# Patient Record
Sex: MALE | Race: WHITE | NOT HISPANIC OR LATINO | URBAN - METROPOLITAN AREA
[De-identification: names, ages, dates, MRNs, and addresses within clinical notes are randomized per-mention and may not be internally consistent; named-entity substitution may affect disease eponyms.]

---

## 2019-06-13 ENCOUNTER — EMERGENCY (EMERGENCY)
Facility: HOSPITAL | Age: 79
LOS: 0 days | Discharge: HOME | End: 2019-06-13
Attending: EMERGENCY MEDICINE | Admitting: EMERGENCY MEDICINE
Payer: MEDICAID

## 2019-06-13 VITALS
RESPIRATION RATE: 16 BRPM | OXYGEN SATURATION: 100 % | TEMPERATURE: 98 F | DIASTOLIC BLOOD PRESSURE: 70 MMHG | HEART RATE: 70 BPM | SYSTOLIC BLOOD PRESSURE: 158 MMHG

## 2019-06-13 VITALS — WEIGHT: 141.1 LBS | HEIGHT: 64.57 IN

## 2019-06-13 DIAGNOSIS — R42 DIZZINESS AND GIDDINESS: ICD-10-CM

## 2019-06-13 LAB
ALBUMIN SERPL ELPH-MCNC: 3.6 G/DL — SIGNIFICANT CHANGE UP (ref 3.3–5)
ALP SERPL-CCNC: 54 U/L — SIGNIFICANT CHANGE UP (ref 40–120)
ALT FLD-CCNC: 41 U/L — SIGNIFICANT CHANGE UP (ref 12–78)
ANION GAP SERPL CALC-SCNC: 8 MMOL/L — SIGNIFICANT CHANGE UP (ref 5–17)
APTT BLD: 24 SEC — LOW (ref 27.5–36.3)
AST SERPL-CCNC: 35 U/L — SIGNIFICANT CHANGE UP (ref 15–37)
BILIRUB SERPL-MCNC: 0.5 MG/DL — SIGNIFICANT CHANGE UP (ref 0.2–1.2)
BUN SERPL-MCNC: 21 MG/DL — SIGNIFICANT CHANGE UP (ref 7–23)
CALCIUM SERPL-MCNC: 9.5 MG/DL — SIGNIFICANT CHANGE UP (ref 8.5–10.1)
CHLORIDE SERPL-SCNC: 108 MMOL/L — SIGNIFICANT CHANGE UP (ref 96–108)
CO2 SERPL-SCNC: 24 MMOL/L — SIGNIFICANT CHANGE UP (ref 22–31)
CREAT SERPL-MCNC: 1.28 MG/DL — SIGNIFICANT CHANGE UP (ref 0.5–1.3)
GLUCOSE SERPL-MCNC: 172 MG/DL — HIGH (ref 70–99)
HCT VFR BLD CALC: 37.6 % — LOW (ref 39–50)
HGB BLD-MCNC: 12.8 G/DL — LOW (ref 13–17)
INR BLD: 0.95 RATIO — SIGNIFICANT CHANGE UP (ref 0.88–1.16)
MAGNESIUM SERPL-MCNC: 1.9 MG/DL — SIGNIFICANT CHANGE UP (ref 1.6–2.6)
MCHC RBC-ENTMCNC: 32.3 PG — SIGNIFICANT CHANGE UP (ref 27–34)
MCHC RBC-ENTMCNC: 34 GM/DL — SIGNIFICANT CHANGE UP (ref 32–36)
MCV RBC AUTO: 94.9 FL — SIGNIFICANT CHANGE UP (ref 80–100)
PLATELET # BLD AUTO: 191 K/UL — SIGNIFICANT CHANGE UP (ref 150–400)
POTASSIUM SERPL-MCNC: 3.9 MMOL/L — SIGNIFICANT CHANGE UP (ref 3.5–5.3)
POTASSIUM SERPL-SCNC: 3.9 MMOL/L — SIGNIFICANT CHANGE UP (ref 3.5–5.3)
PROT SERPL-MCNC: 7.2 GM/DL — SIGNIFICANT CHANGE UP (ref 6–8.3)
PROTHROM AB SERPL-ACNC: 10.5 SEC — SIGNIFICANT CHANGE UP (ref 10–12.9)
RBC # BLD: 3.96 M/UL — LOW (ref 4.2–5.8)
RBC # FLD: 12.8 % — SIGNIFICANT CHANGE UP (ref 10.3–14.5)
SODIUM SERPL-SCNC: 140 MMOL/L — SIGNIFICANT CHANGE UP (ref 135–145)
TROPONIN I SERPL-MCNC: <0.015 NG/ML — SIGNIFICANT CHANGE UP (ref 0.01–0.04)
WBC # BLD: 11.06 K/UL — HIGH (ref 3.8–10.5)
WBC # FLD AUTO: 11.06 K/UL — HIGH (ref 3.8–10.5)

## 2019-06-13 PROCEDURE — 99284 EMERGENCY DEPT VISIT MOD MDM: CPT

## 2019-06-13 PROCEDURE — 71045 X-RAY EXAM CHEST 1 VIEW: CPT | Mod: 26

## 2019-06-13 PROCEDURE — 93010 ELECTROCARDIOGRAM REPORT: CPT

## 2019-06-13 PROCEDURE — 70450 CT HEAD/BRAIN W/O DYE: CPT | Mod: 26

## 2019-06-13 RX ORDER — MECLIZINE HCL 12.5 MG
1 TABLET ORAL
Qty: 20 | Refills: 0
Start: 2019-06-13

## 2019-06-13 NOTE — ED STATDOCS - PROGRESS NOTE DETAILS
Ama AS for Dr. Noble: 79 y/o male presents to the ED c/o dizziness since this morning. Pt states this morning when he bent down to tie his shoes he started to feel dizzy. Dizziness as in the room was spinning. Pt also states that after the dizziness he had 3 episodes of vomiting. Also states that he has some mild chest discomfort. Pt took his BP, which was 180/65. Denies SOB. Will send pt to main ED for further evaluation.

## 2019-06-13 NOTE — ED PROVIDER NOTE - OBJECTIVE STATEMENT
79 y/o male with no pertinent PMHx presents to the ED c/o dizziness since this morning. Pt states this morning he woke up fine and ate breakfast. States he was going to go outside when he bent over to put his socks on and felt dizzy. Dizziness described as the room was spinning. States he laid down because he also felt generalized weakness. After two hours he still felt dizzy and began vomiting. States symptoms improved when laying down. States he thinks his symptoms might be related to what he ate. Had " a lot" of watermelon yesterday and cottage cheese this morning. In ED, pt states he feels better. Denies chest pain.

## 2019-06-13 NOTE — ED ADULT NURSE NOTE - NS ED NURSE RECORD ANOTHER HT AND WT
6/2/2017 12:18 PM 
 
Mr. Sal Travis [address] [address] Dear  Mikey Jose, 
 
 
I am a Nurse Navigator working with your primary care provider (PCP), Dr. Tisha Jackman. Part of my job is to follow up with patients who have been in the hospital to see how they are feeling, answer any questions they may have about their visit, and also make sure they have a follow-up appointment to see their primary care doctor. I have been unable to reach you by telephone and wanted to make sure that you scheduled a follow-up appointment to come in and talk to Dr. Tisha Jackman about your recent visit to the hospital.   
 
Please call our office at [phone] to schedule your appointment as soon as possible. If you have any questions or concerns, please call [phone]. Thank you for allowing us to participate in your care! Sincerely, Dulce NEWELL, RN   Nurse Navigator [address] Office   [phone] Fax   [phone] Yes

## 2019-06-13 NOTE — ED ADULT NURSE NOTE - OBJECTIVE STATEMENT
Pt alert and oriented x3. pt presents with dizziness before his morning walk this morning. Pt states "I got up for my walk and felt the room spinning" Pt denies chest pain SOB, palpitations. States dizziness is "a little better" at this time.

## 2019-06-13 NOTE — ED PROVIDER NOTE - EYES, MLM
Clear bilaterally, pupils equal, round and reactive to light. +Few beats of horizontal nystagmus right gaze

## 2019-06-13 NOTE — ED PROVIDER NOTE - PROGRESS NOTE DETAILS
Remains asymptomatic.  Will d/c with vertigo instructions -- Rx Meclizine PRN.  Add'l hx -- patient visiting from [place] with recent plane flight.  Some discomfort right ear during flight - ?related.

## 2021-01-05 NOTE — ED PROVIDER NOTE - RESPIRATORY, MLM
Breath sounds clear and equal bilaterally. [FreeTextEntry1] : Pleasant 36 year old male with no past medical history here for follow up.\par \par \par 1. 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 seronegative RA\par \par -Negative MEENA, RF, CCP. Normal ESR and CRP in 9/2020 \par -X-ray of right foot, bilateral knees,  bilateral ankles negative in 2019\par -X-ray of bilateral knees and elbows negative. Bilateral hands show diffuse swelling of right third PIP in 8/2020 \par -Admitted 9/2020 for hand pain and right hand x-ray suggested worsening PIP swelling, erosion, and periostitis that are suggestive of psoriatic arthritis. MRI to follow up was recommended in report. Right wrist and elbow x-ray unrevealing\par -Erythema bilateral palms including on lateral side with new outbreak of rash ? post inflammatory hyperpigmentation related to underlying psoriasis. \par -Check MRI right hand \par -Start prednisone 15 mg daily x 2 weeks, 10 mg x 2 weeks until follow up in 1 month\par -Start Humira 40 mg q2 weeks\par -F/u 1 month\par \par \par \par
